# Patient Record
Sex: FEMALE | Race: OTHER
[De-identification: names, ages, dates, MRNs, and addresses within clinical notes are randomized per-mention and may not be internally consistent; named-entity substitution may affect disease eponyms.]

---

## 2020-04-08 ENCOUNTER — HOSPITAL ENCOUNTER (EMERGENCY)
Dept: HOSPITAL 72 - EMR | Age: 25
Discharge: HOME | End: 2020-04-08
Payer: COMMERCIAL

## 2020-04-08 VITALS — BODY MASS INDEX: 26.5 KG/M2 | WEIGHT: 135 LBS | HEIGHT: 60 IN

## 2020-04-08 VITALS — DIASTOLIC BLOOD PRESSURE: 69 MMHG | SYSTOLIC BLOOD PRESSURE: 112 MMHG

## 2020-04-08 VITALS — DIASTOLIC BLOOD PRESSURE: 71 MMHG | SYSTOLIC BLOOD PRESSURE: 115 MMHG

## 2020-04-08 DIAGNOSIS — R06.02: ICD-10-CM

## 2020-04-08 DIAGNOSIS — R07.9: ICD-10-CM

## 2020-04-08 DIAGNOSIS — R05: ICD-10-CM

## 2020-04-08 DIAGNOSIS — J06.9: Primary | ICD-10-CM

## 2020-04-08 PROCEDURE — 71045 X-RAY EXAM CHEST 1 VIEW: CPT

## 2020-04-08 PROCEDURE — 99283 EMERGENCY DEPT VISIT LOW MDM: CPT

## 2020-04-08 NOTE — DIAGNOSTIC IMAGING REPORT
Indication: Cough

 

Technique: One view of the chest

 

Comparison: 1/1/2014

 

Findings: Lungs and pleural spaces are clear. Heart size is normal. No significant

change

 

Impression: No acute process

## 2020-04-08 NOTE — NUR
ED Nurse Note:



Patient walked in to ED c/o SOB and chest congestion x4 days. Per pt, she has 
been taking robitusin but no relief. Reports that deep breaths and lying down 
makes her pain worse. Afebrile. Pt alert and oriented x4 verbally responsive. 
ERMD at bedside.

## 2020-04-08 NOTE — EMERGENCY ROOM REPORT
History of Present Illness


General


Chief Complaint:  Upper Respiratory Illness


Source:  Patient





Present Illness


HPI


Disclaimer: Please note that this report is being documented using [employer] 

technology. This can lead to erroneous entry secondary to incorrect 

interpretation by the dictating instrument.





HPI: 25-year-old female no reported past medical history presented for cough, 

shortness of breath and chest pains.  She states that symptoms present for the 

past 3 to 4 days.  No fever.  Nausea but no vomiting today.  Denies diarrhea.  

Pain with coughing is about 5 out of 10 retrosternal nonradiating.  No travel 

history.  No sick contacts.


 


PMH: Patient denies any past medical history


 


PSH: Reviewed


 


 Social Hx: No smoking drinking or illicit drug use


Allergies:  


Coded Allergies:  


     No Known Allergies (Unverified , 1/1/14)





COVID-19 Screening


Contact w/high risk pt:  No


Recent Travel to affected area:  No


Experienced COVID-19 symptoms?:  Yes


COVID-19 symptoms experienced:  Shortness of Breath, Cough





Patient History


Last Menstrual Period:  3/24





Nursing Documentation-PMH


Past Medical History:  No Stated History





Review of Systems


All Other Systems:  negative except mentioned in HPI





Physical Exam





Vital Signs








  Date Time  Temp Pulse Resp B/P (MAP) Pulse Ox O2 Delivery O2 Flow Rate FiO2


 


4/8/20 05:56 98.2 68 20 112/69 (83) 100 Room Air  








Sp02 EP Interpretation:  reviewed, normal


General Appearance:  well appearing, no apparent distress


Head:  normocephalic, atraumatic


Eyes:  bilateral eye PERRL, bilateral eye EOMI


ENT:  hearing grossly normal, moist mucus membranes


Neck:  full range of motion, supple


Respiratory:  lungs clear, normal breath sounds, no rhonchi, no respiratory 

distress, no retraction, no wheezing


Cardiovascular #1:  normal peripheral pulses, regular rate, rhythm, no murmur


Gastrointestinal:  non tender, soft, non-distended, no guarding


Neurologic:  alert, oriented x3, no focal defects


Skin:  normal color, warm/dry





Medical Decision Making


Diagnostic Impression:  


 Primary Impression:  


 URI (upper respiratory infection)


ER Course


MDM: 25-year-old female presents with cough and shortness of breath.  No acute 

distress on exam.  Not hypoxic, afebrile.








differential diagnosis:  included but not limited to URI, coronavirus infection

, pneumonia, less likely ACS or PE.  Vital signs were stable.





Clinical course


Patient placed on stretcher.  Patient again on no acute distress on exam.  

Nontoxic-appearing patient in no apparent distress.








Diagnosis -URI





Stable and discharged to home with prescriptions.  Patient given coronavirus 

discharge instructions.  She does have corona virus testing arranged for this 

Friday.  Directed to isolate herself and close contacts.  Given return 

precautions.


Chest X-Ray Diagnostic Results


Chest X-Ray Diagnostic Results :  


   Chest X-Ray Ordered:  Yes


   # of Views/Limited/Complete:  1 View


   Indication:  Shortness of Breath


   EP Interpretation:  Yes


   Interpretation:  no consolidation, no effusion, no pneumothorax


   Electronically Signed by:  Moiz Reyez MD





Last Vital Signs








  Date Time  Temp Pulse Resp B/P (MAP) Pulse Ox O2 Delivery O2 Flow Rate FiO2


 


4/8/20 06:04 98.2 68 20 112/69 100 Room Air  








Disposition:  HOME, SELF-CARE


Condition:  Stable


Scripts


Acetaminophen* (ACETAMINOPHEN EXTRA STRENGTH*) 500 Mg Tablet


500 MG ORAL Q6H PRN for For Pain, #30 TAB


   Prov: Moiz Reyez M.D.         4/8/20 


Ondansetron* (ZOFRAN*) 4 Mg Tablet


4 MG ORAL Q6H PRN for Nausea & Vomiting, #12 TAB


   Prov: Moiz Reyez M.D.         4/8/20


Referrals:  


[facility],REFERRING (PCP)





Additional Instructions:  


Patient is instructed to follow-up with her primary care doctor, primary care 

clinic or county clinic in 1 to 2 days.  Patient instructed to return for any 

worsening symptoms or concerns.


Please note that the documentation in this note was used with [employer] dictation 

technology.  Pleae be advised that this may lead to erroneous  text due to 

misinterpretation by the dictation software











Moiz Reyez M.D. Apr 8, 2020 06:24

## 2020-04-30 ENCOUNTER — HOSPITAL ENCOUNTER (EMERGENCY)
Dept: HOSPITAL 87 - ER | Age: 25
LOS: 1 days | Discharge: HOME | End: 2020-05-01
Payer: COMMERCIAL

## 2020-04-30 VITALS — WEIGHT: 174.17 LBS | HEIGHT: 62 IN | BODY MASS INDEX: 32.05 KG/M2

## 2020-04-30 DIAGNOSIS — T78.40XA: Primary | ICD-10-CM
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
## 2020-04-30 PROCEDURE — 96374 THER/PROPH/DIAG INJ IV PUSH: CPT

## 2020-04-30 PROCEDURE — 99284 EMERGENCY DEPT VISIT MOD MDM: CPT

## 2020-04-30 PROCEDURE — 96375 TX/PRO/DX INJ NEW DRUG ADDON: CPT

## 2020-05-01 VITALS — SYSTOLIC BLOOD PRESSURE: 112 MMHG | DIASTOLIC BLOOD PRESSURE: 57 MMHG
